# Patient Record
Sex: MALE | URBAN - METROPOLITAN AREA
[De-identification: names, ages, dates, MRNs, and addresses within clinical notes are randomized per-mention and may not be internally consistent; named-entity substitution may affect disease eponyms.]

---

## 2019-06-24 ENCOUNTER — OFFICE VISIT (OUTPATIENT)
Dept: URBAN - METROPOLITAN AREA CLINIC 11 | Facility: CLINIC | Age: 54
End: 2019-06-24
Payer: MEDICARE

## 2019-06-24 DIAGNOSIS — H43.13 VITREOUS HEMORRHAGE, BILATERAL: ICD-10-CM

## 2019-06-24 DIAGNOSIS — H40.1122 PRIMARY OPEN-ANGLE GLAUCOMA, LEFT EYE, MODERATE STAGE: ICD-10-CM

## 2019-06-24 DIAGNOSIS — E11.3593 TYPE 2 DIABETES MELLITUS W/ PROLIFERATIVE DIABETIC RETINOPATHY W/O MACULAR EDEMA, BILATERAL: Primary | ICD-10-CM

## 2019-06-24 PROCEDURE — 99204 OFFICE O/P NEW MOD 45 MIN: CPT | Performed by: OPTOMETRIST

## 2019-06-24 PROCEDURE — 92134 CPTRZ OPH DX IMG PST SGM RTA: CPT | Performed by: OPTOMETRIST

## 2019-06-24 ASSESSMENT — INTRAOCULAR PRESSURE
OS: 17
OD: 16
OS: 18
OD: 17

## 2019-06-24 NOTE — IMPRESSION/PLAN
Impression: Primary open-angle glaucoma, left eye, moderate stage: O49.0404. OCT 6/19 9/8 88/63 Plan: OCT of RNFL ordered and performed today ou. RNFL thinning OS. ( OCT not billed today ) Sample Lumigan 1gt qhs OS.  Pt ed on importance of drops and regular follow ups. schedule VF.
f/u 1mn VF IOP OCT

## 2019-06-24 NOTE — IMPRESSION/PLAN
Impression: Type 2 diabetes mellitus w/ proliferative diabetic retinopathy w/o macular edema, bilateral: Z49.0629. Plan: OCT of macula ordered and performed today ou. No DME OU. Signs of neovascularization noted. Will refer patient for a retinal consult and tx with retinal specialist.  Discussed ocular and systemic benefits of maintaining blood sugar control.  RTC 1-2wks DE